# Patient Record
Sex: FEMALE | Race: WHITE | NOT HISPANIC OR LATINO | Employment: UNEMPLOYED | URBAN - METROPOLITAN AREA
[De-identification: names, ages, dates, MRNs, and addresses within clinical notes are randomized per-mention and may not be internally consistent; named-entity substitution may affect disease eponyms.]

---

## 2022-11-29 ENCOUNTER — OFFICE VISIT (OUTPATIENT)
Dept: FAMILY MEDICINE CLINIC | Facility: CLINIC | Age: 37
End: 2022-11-29

## 2022-11-29 VITALS
SYSTOLIC BLOOD PRESSURE: 140 MMHG | OXYGEN SATURATION: 98 % | WEIGHT: 129.8 LBS | DIASTOLIC BLOOD PRESSURE: 90 MMHG | HEIGHT: 66 IN | RESPIRATION RATE: 16 BRPM | BODY MASS INDEX: 20.86 KG/M2 | HEART RATE: 88 BPM | TEMPERATURE: 98.6 F

## 2022-11-29 DIAGNOSIS — J45.20 MILD INTERMITTENT ASTHMA WITHOUT COMPLICATION: Primary | ICD-10-CM

## 2022-11-29 DIAGNOSIS — J30.1 SEASONAL ALLERGIC RHINITIS DUE TO POLLEN: ICD-10-CM

## 2022-11-29 RX ORDER — ALBUTEROL SULFATE 90 UG/1
2 AEROSOL, METERED RESPIRATORY (INHALATION) EVERY 6 HOURS PRN
Qty: 6.7 G | Refills: 5 | Status: SHIPPED | OUTPATIENT
Start: 2022-11-29

## 2022-11-29 RX ORDER — MONTELUKAST SODIUM 10 MG/1
TABLET ORAL
COMMUNITY
Start: 2022-10-15

## 2022-11-29 RX ORDER — FLUTICASONE FUROATE AND VILANTEROL 100; 25 UG/1; UG/1
1 POWDER RESPIRATORY (INHALATION) DAILY
Qty: 60 BLISTER | Refills: 0 | Status: SHIPPED | OUTPATIENT
Start: 2022-11-29 | End: 2022-12-29

## 2022-11-29 NOTE — PROGRESS NOTES
Assessment/Plan:         Problem List Items Addressed This Visit        Respiratory    Mild intermittent asthma without complication - Primary     Under control  Continue current medication  We will re-evaluate at next office visit  Relevant Medications    montelukast (SINGULAIR) 10 mg tablet    Fluticasone Furoate-Vilanterol (Breo Ellipta) 100-25 mcg/actuation inhaler    albuterol (Proventil HFA) 90 mcg/act inhaler    Seasonal allergic rhinitis due to pollen     Under control  Continue current medication  We will re-evaluate at next office visit  Subjective:      Patient ID: Eleazar Munguia is a 42 y o  female  Patient here for review of chronic medical problems and review of the labs and imaging if it is applicable  Currently has no specific complaints other than mentioned in the review of systems  Denies chest pain,, abdominal pain, nausea, vomiting, fever, chills, lightheadedness, dizziness,headache, tingling or numbness  No bowel or bladder problem  Lately patient using rescue inhaler quite often because of allergies acting up and she does feel like chest tightness and wheezing she uses rescue inhaler at least once or twice a day with she never used to do that way        The following portions of the patient's history were reviewed and updated as appropriate:   Past Medical History:  She has a past medical history of Asthma ,  _______________________________________________________________________  Medical Problems:  does not have any pertinent problems on file ,  _______________________________________________________________________  Past Surgical History:   has no past surgical history on file ,  _______________________________________________________________________  Family History:  family history includes Heart attack in her father; Hypertension in her father ,  _______________________________________________________________________  Social History:   reports that she has never smoked  She has never used smokeless tobacco  She reports that she does not drink alcohol and does not use drugs  ,  _______________________________________________________________________  Allergies:  has No Known Allergies     _______________________________________________________________________  Current Outpatient Medications   Medication Sig Dispense Refill   • albuterol (Proventil HFA) 90 mcg/act inhaler Inhale 2 puffs every 6 (six) hours as needed for wheezing 6 7 g 5   • Fexofenadine HCl (ALLEGRA PO) Take by mouth in the morning     • Fluticasone Furoate-Vilanterol (Breo Ellipta) 100-25 mcg/actuation inhaler Inhale 1 puff daily Rinse mouth after use  60 blister 0   • montelukast (SINGULAIR) 10 mg tablet        No current facility-administered medications for this visit      _______________________________________________________________________  Review of Systems   Constitutional: Negative for chills, fatigue and fever  HENT: Negative for congestion, ear pain, rhinorrhea, sneezing and sore throat  Eyes: Negative for redness, itching and visual disturbance  Respiratory: Negative for cough, chest tightness and shortness of breath  Cardiovascular: Negative for chest pain, palpitations and leg swelling  Gastrointestinal: Negative for abdominal pain, blood in stool, diarrhea, nausea and vomiting  Endocrine: Negative for cold intolerance and heat intolerance  Genitourinary: Negative for dysuria, frequency and urgency  Musculoskeletal: Negative for arthralgias, back pain and myalgias  Skin: Negative for color change and rash  Neurological: Negative for dizziness, weakness, light-headedness, numbness and headaches  Hematological: Does not bruise/bleed easily  Psychiatric/Behavioral: Negative for agitation, behavioral problems and confusion           Objective:  Vitals:    11/29/22 1553   BP: 140/90   BP Location: Left arm   Patient Position: Sitting   Cuff Size: Standard Pulse: 88   Resp: 16   Temp: 98 6 °F (37 °C)   TempSrc: Temporal   SpO2: 98%   Weight: 58 9 kg (129 lb 12 8 oz)   Height: 5' 6" (1 676 m)     Body mass index is 20 95 kg/m²  Physical Exam  Vitals and nursing note reviewed  Constitutional:       General: She is not in acute distress  Appearance: Normal appearance  She is not ill-appearing, toxic-appearing or diaphoretic  HENT:      Head: Normocephalic and atraumatic  Right Ear: Tympanic membrane normal       Left Ear: Tympanic membrane normal       Nose: Nose normal  No congestion  Mouth/Throat:      Mouth: Mucous membranes are moist    Eyes:      General: No scleral icterus  Right eye: No discharge  Left eye: No discharge  Extraocular Movements: Extraocular movements intact  Conjunctiva/sclera: Conjunctivae normal       Pupils: Pupils are equal, round, and reactive to light  Cardiovascular:      Rate and Rhythm: Normal rate and regular rhythm  Pulses: Normal pulses  Heart sounds: Normal heart sounds  No murmur heard  No gallop  Pulmonary:      Effort: Pulmonary effort is normal  No respiratory distress  Breath sounds: Normal breath sounds  No wheezing, rhonchi or rales  Abdominal:      General: Abdomen is flat  Bowel sounds are normal  There is no distension  Palpations: Abdomen is soft  Tenderness: There is no abdominal tenderness  There is no guarding  Musculoskeletal:         General: No swelling or tenderness  Normal range of motion  Cervical back: Normal range of motion and neck supple  No rigidity  Lymphadenopathy:      Cervical: No cervical adenopathy  Skin:     General: Skin is warm  Capillary Refill: Capillary refill takes 2 to 3 seconds  Coloration: Skin is not jaundiced  Findings: No bruising or rash  Neurological:      General: No focal deficit present  Mental Status: She is alert and oriented to person, place, and time   Mental status is at baseline        Gait: Gait normal    Psychiatric:         Mood and Affect: Mood normal

## 2022-12-09 ENCOUNTER — TELEPHONE (OUTPATIENT)
Dept: FAMILY MEDICINE CLINIC | Facility: CLINIC | Age: 37
End: 2022-12-09

## 2022-12-09 NOTE — TELEPHONE ENCOUNTER
it is usually viral infection at this point just treat the symptoms with Mucinex DM and Tylenol and maybe Flonase if no better by Monday or Tuesday then we can do virtual visit

## 2022-12-09 NOTE — TELEPHONE ENCOUNTER
Hi, this is Arjacquelinee [place] calling on the Patient therapy office  I was actually just in, I believe it was last week  If you would please give me a call back [phone]  I was hoping to possibly be able to get into see Doctor Vero Nelson today  Pretty sure I'm starting with sinus infection  If you could please give me a call back, I'd appreciate it  Thank you      Please advise Pt is not signed up for my chart

## 2022-12-12 ENCOUNTER — TELEPHONE (OUTPATIENT)
Dept: FAMILY MEDICINE CLINIC | Facility: CLINIC | Age: 37
End: 2022-12-12

## 2022-12-12 ENCOUNTER — OFFICE VISIT (OUTPATIENT)
Dept: FAMILY MEDICINE CLINIC | Facility: CLINIC | Age: 37
End: 2022-12-12

## 2022-12-12 VITALS
TEMPERATURE: 98.3 F | BODY MASS INDEX: 20.76 KG/M2 | OXYGEN SATURATION: 99 % | DIASTOLIC BLOOD PRESSURE: 66 MMHG | WEIGHT: 129.2 LBS | SYSTOLIC BLOOD PRESSURE: 110 MMHG | HEIGHT: 66 IN | RESPIRATION RATE: 18 BRPM | HEART RATE: 94 BPM

## 2022-12-12 DIAGNOSIS — J01.00 ACUTE NON-RECURRENT MAXILLARY SINUSITIS: Primary | ICD-10-CM

## 2022-12-12 RX ORDER — AMOXICILLIN AND CLAVULANATE POTASSIUM 875; 125 MG/1; MG/1
1 TABLET, FILM COATED ORAL EVERY 12 HOURS SCHEDULED
Qty: 20 TABLET | Refills: 0 | Status: SHIPPED | OUTPATIENT
Start: 2022-12-12 | End: 2022-12-22

## 2022-12-12 NOTE — PROGRESS NOTES
Assessment/Plan:         Problem List Items Addressed This Visit    None  Visit Diagnoses     Acute non-recurrent maxillary sinusitis    -  Primary    Relevant Medications    amoxicillin-clavulanate (Augmentin) 875-125 mg per tablet            Subjective:      Patient ID: Kenan Ray is a 42 y o  female  HPI    The following portions of the patient's history were reviewed and updated as appropriate:   Past Medical History:  She has a past medical history of Asthma ,  _______________________________________________________________________  Medical Problems:  does not have any pertinent problems on file ,  _______________________________________________________________________  Past Surgical History:   has no past surgical history on file ,  _______________________________________________________________________  Family History:  family history includes Heart attack in her father; Hypertension in her father ,  _______________________________________________________________________  Social History:   reports that she has never smoked  She has been exposed to tobacco smoke  She has never used smokeless tobacco  She reports that she does not drink alcohol and does not use drugs  ,  _______________________________________________________________________  Allergies:  has No Known Allergies     _______________________________________________________________________  Current Outpatient Medications   Medication Sig Dispense Refill   • albuterol (Proventil HFA) 90 mcg/act inhaler Inhale 2 puffs every 6 (six) hours as needed for wheezing 6 7 g 5   • amoxicillin-clavulanate (Augmentin) 875-125 mg per tablet Take 1 tablet by mouth every 12 (twelve) hours for 10 days 20 tablet 0   • Fexofenadine HCl (ALLEGRA PO) Take by mouth in the morning     • Fluticasone Furoate-Vilanterol (Breo Ellipta) 100-25 mcg/actuation inhaler Inhale 1 puff daily Rinse mouth after use   60 blister 0   • montelukast (SINGULAIR) 10 mg tablet        No current facility-administered medications for this visit      _______________________________________________________________________  Review of Systems   Constitutional: Negative for chills, fatigue and fever  HENT: Positive for congestion, hearing loss (reduced), postnasal drip, rhinorrhea, sinus pressure, sinus pain, sore throat and voice change  Negative for ear pain and sneezing  Eyes: Negative for redness, itching and visual disturbance  Respiratory: Positive for cough  Negative for chest tightness and shortness of breath  Cardiovascular: Negative for chest pain, palpitations and leg swelling  Gastrointestinal: Negative for abdominal pain, blood in stool, diarrhea, nausea and vomiting  Endocrine: Negative for cold intolerance and heat intolerance  Genitourinary: Negative for dysuria, frequency and urgency  Musculoskeletal: Negative for arthralgias, back pain and myalgias  Skin: Negative for color change and rash  Neurological: Positive for headaches  Negative for dizziness, weakness, light-headedness and numbness  Hematological: Does not bruise/bleed easily  Psychiatric/Behavioral: Negative for agitation, behavioral problems and confusion  Objective:  Vitals:    12/12/22 1613   BP: 110/66   BP Location: Left arm   Patient Position: Sitting   Cuff Size: Standard   Pulse: 94   Resp: 18   Temp: 98 3 °F (36 8 °C)   TempSrc: Tympanic   SpO2: 99%   Weight: 58 6 kg (129 lb 3 2 oz)   Height: 5' 6" (1 676 m)     Body mass index is 20 85 kg/m²  Physical Exam  Vitals and nursing note reviewed  Constitutional:       General: She is not in acute distress  Appearance: Normal appearance  She is not ill-appearing, toxic-appearing or diaphoretic  HENT:      Head: Normocephalic and atraumatic  Right Ear: Tympanic membrane normal       Left Ear: Tympanic membrane normal       Nose: Congestion and rhinorrhea present        Mouth/Throat:      Mouth: Mucous membranes are moist  Pharynx: Posterior oropharyngeal erythema present  Eyes:      General: No scleral icterus  Right eye: No discharge  Left eye: No discharge  Extraocular Movements: Extraocular movements intact  Conjunctiva/sclera: Conjunctivae normal       Pupils: Pupils are equal, round, and reactive to light  Cardiovascular:      Rate and Rhythm: Normal rate and regular rhythm  Pulses: Normal pulses  Heart sounds: Normal heart sounds  No murmur heard  No gallop  Pulmonary:      Effort: Pulmonary effort is normal  No respiratory distress  Breath sounds: Wheezing present  No rhonchi or rales  Abdominal:      General: Abdomen is flat  Bowel sounds are normal  There is no distension  Palpations: Abdomen is soft  Tenderness: There is no abdominal tenderness  There is no guarding  Musculoskeletal:         General: No swelling or tenderness  Normal range of motion  Cervical back: Normal range of motion and neck supple  No rigidity  Lymphadenopathy:      Cervical: No cervical adenopathy  Skin:     General: Skin is warm  Capillary Refill: Capillary refill takes 2 to 3 seconds  Coloration: Skin is not jaundiced  Findings: No bruising or rash  Neurological:      General: No focal deficit present  Mental Status: She is alert and oriented to person, place, and time  Mental status is at baseline        Gait: Gait normal    Psychiatric:         Mood and Affect: Mood normal

## 2022-12-12 NOTE — TELEPHONE ENCOUNTER
Hi, this is Sukhjinder Hum calling  My phone number is [phone]  I was calling because I'm still not feeling much better with what I've been doing here  I still have a ton of sinus pressure  My ears are bothering me, my jaw hurts  So I would like to be able to get in and see Doctor Iram Paulino at some point today  So if you could please give me a call back, I'd appreciate it  Again, my phone number is [phone]  Thank you      Pt is not signed up for my chart    Please advise in office or virtual

## 2023-01-13 DIAGNOSIS — J45.20 MILD INTERMITTENT ASTHMA WITHOUT COMPLICATION: Primary | ICD-10-CM

## 2023-01-13 RX ORDER — MONTELUKAST SODIUM 10 MG/1
TABLET ORAL
Qty: 90 TABLET | Refills: 3 | Status: SHIPPED | OUTPATIENT
Start: 2023-01-13

## 2023-04-05 ENCOUNTER — OFFICE VISIT (OUTPATIENT)
Dept: FAMILY MEDICINE CLINIC | Facility: CLINIC | Age: 38
End: 2023-04-05

## 2023-04-05 VITALS
HEART RATE: 78 BPM | SYSTOLIC BLOOD PRESSURE: 120 MMHG | DIASTOLIC BLOOD PRESSURE: 76 MMHG | RESPIRATION RATE: 18 BRPM | HEIGHT: 66 IN | WEIGHT: 125 LBS | BODY MASS INDEX: 20.09 KG/M2 | OXYGEN SATURATION: 99 % | TEMPERATURE: 97.8 F

## 2023-04-05 DIAGNOSIS — J45.20 MILD INTERMITTENT ASTHMA WITHOUT COMPLICATION: Primary | ICD-10-CM

## 2023-04-05 DIAGNOSIS — J30.1 SEASONAL ALLERGIC RHINITIS DUE TO POLLEN: ICD-10-CM

## 2023-04-05 DIAGNOSIS — E55.9 VITAMIN D DEFICIENCY: ICD-10-CM

## 2023-04-05 DIAGNOSIS — E78.2 MIXED HYPERLIPIDEMIA: ICD-10-CM

## 2023-04-05 DIAGNOSIS — Z00.00 HEALTHCARE MAINTENANCE: ICD-10-CM

## 2023-04-05 NOTE — PROGRESS NOTES
Assessment/Plan:         Problem List Items Addressed This Visit        Respiratory    Mild intermittent asthma without complication - Primary     Under control  Continue current medication  We will re-evaluate at next office visit  Seasonal allergic rhinitis due to pollen     Under control  Continue current medication  We will re-evaluate at next office visit  Other Visit Diagnoses     Vitamin D deficiency        Relevant Orders    Vitamin D 25 hydroxy    Healthcare maintenance        Relevant Orders    CBC and differential    Comprehensive metabolic panel    Mixed hyperlipidemia        Relevant Orders    Lipid Panel with Direct LDL reflex            Subjective:      Patient ID: Margaret Cortez is a 42 y o  female  Patient here for review of chronic medical problems and review of the labs and imaging if it is applicable  Currently has no specific complaints other than mentioned in the review of systems  Denies chest pain, SOB, cough, abdominal pain, nausea, vomiting, fever, chills, lightheadedness, dizziness,headache, tingling or numbness  No bowel or bladder problem  The following portions of the patient's history were reviewed and updated as appropriate:   Past Medical History:  She has a past medical history of Allergic rhinitis and Asthma ,  _______________________________________________________________________  Medical Problems:  does not have any pertinent problems on file ,  _______________________________________________________________________  Past Surgical History:   has a past surgical history that includes Breast surgery;  section; Leg Surgery; and Nasal septum surgery  ,  _______________________________________________________________________  Family History:  family history includes Heart attack in her father; Hyperlipidemia in her mother; Hypertension in her father and mother ,  _______________________________________________________________________  Social History:   reports that she has never smoked  She has been exposed to tobacco smoke  She has never used smokeless tobacco  She reports that she does not drink alcohol and does not use drugs  ,  _______________________________________________________________________  Allergies:  is allergic to latex     _______________________________________________________________________  Current Outpatient Medications   Medication Sig Dispense Refill   • albuterol (Proventil HFA) 90 mcg/act inhaler Inhale 2 puffs every 6 (six) hours as needed for wheezing 6 7 g 5   • Fexofenadine HCl (ALLEGRA PO) Take by mouth in the morning     • montelukast (SINGULAIR) 10 mg tablet TAKE 1 TABLET DAILY 90 tablet 3     No current facility-administered medications for this visit      _______________________________________________________________________  Review of Systems   Constitutional: Negative for chills, fatigue and fever  HENT: Negative for congestion, ear pain, rhinorrhea, sneezing and sore throat  Eyes: Negative for redness, itching and visual disturbance  Respiratory: Negative for cough, chest tightness and shortness of breath  Cardiovascular: Negative for chest pain, palpitations and leg swelling  Gastrointestinal: Negative for abdominal pain, blood in stool, diarrhea, nausea and vomiting  Endocrine: Negative for cold intolerance and heat intolerance  Genitourinary: Negative for dysuria, frequency and urgency  Musculoskeletal: Negative for arthralgias, back pain and myalgias  Skin: Negative for color change and rash  Neurological: Negative for dizziness, weakness, light-headedness, numbness and headaches  Hematological: Does not bruise/bleed easily  Psychiatric/Behavioral: Negative for agitation, behavioral problems and confusion           Objective:  Vitals:    04/05/23 1202   BP: 120/76   BP Location: Left arm   Patient "Position: Sitting   Cuff Size: Standard   Pulse: 78   Resp: 18   Temp: 97 8 °F (36 6 °C)   TempSrc: Tympanic   SpO2: 99%   Weight: 56 7 kg (125 lb)   Height: 5' 6\" (1 676 m)     Body mass index is 20 18 kg/m²  Physical Exam  Vitals and nursing note reviewed  Constitutional:       General: She is not in acute distress  Appearance: Normal appearance  She is not ill-appearing, toxic-appearing or diaphoretic  HENT:      Head: Normocephalic and atraumatic  Right Ear: Tympanic membrane and ear canal normal       Left Ear: Tympanic membrane and ear canal normal       Nose: Nose normal  No congestion  Mouth/Throat:      Mouth: Mucous membranes are moist    Eyes:      General: No scleral icterus  Right eye: No discharge  Left eye: No discharge  Extraocular Movements: Extraocular movements intact  Conjunctiva/sclera: Conjunctivae normal       Pupils: Pupils are equal, round, and reactive to light  Cardiovascular:      Rate and Rhythm: Normal rate and regular rhythm  Pulses: Normal pulses  Heart sounds: Normal heart sounds  No murmur heard  No gallop  Pulmonary:      Effort: Pulmonary effort is normal  No respiratory distress  Breath sounds: Normal breath sounds  No wheezing, rhonchi or rales  Abdominal:      General: Abdomen is flat  Bowel sounds are normal  There is no distension  Palpations: Abdomen is soft  Tenderness: There is no abdominal tenderness  There is no right CVA tenderness, left CVA tenderness or guarding  Musculoskeletal:         General: No swelling or tenderness  Normal range of motion  Cervical back: Normal range of motion and neck supple  No rigidity  Right lower leg: No edema  Left lower leg: No edema  Lymphadenopathy:      Cervical: No cervical adenopathy  Skin:     General: Skin is warm  Capillary Refill: Capillary refill takes 2 to 3 seconds  Coloration: Skin is not jaundiced        Findings: " No bruising or rash  Neurological:      General: No focal deficit present  Mental Status: She is alert and oriented to person, place, and time  Mental status is at baseline  Motor: No weakness        Gait: Gait normal    Psychiatric:         Mood and Affect: Mood normal          Behavior: Behavior normal

## 2023-04-14 ENCOUNTER — TELEPHONE (OUTPATIENT)
Dept: FAMILY MEDICINE CLINIC | Facility: CLINIC | Age: 38
End: 2023-04-14

## 2023-04-14 NOTE — TELEPHONE ENCOUNTER
Admitted to [facility] on 9/15/21.       Pt called requesting an inhaler as her allergies are bad and she feels a heaviness in her chest  Does not want Breo

## 2023-07-21 ENCOUNTER — APPOINTMENT (OUTPATIENT)
Dept: LAB | Facility: MEDICAL CENTER | Age: 38
End: 2023-07-21
Payer: COMMERCIAL

## 2023-07-21 DIAGNOSIS — Z00.00 HEALTHCARE MAINTENANCE: ICD-10-CM

## 2023-07-21 DIAGNOSIS — E55.9 VITAMIN D DEFICIENCY: ICD-10-CM

## 2023-07-21 DIAGNOSIS — E78.2 MIXED HYPERLIPIDEMIA: ICD-10-CM

## 2023-07-21 LAB
25(OH)D3 SERPL-MCNC: 67.8 NG/ML (ref 30–100)
ALBUMIN SERPL BCP-MCNC: 4 G/DL (ref 3.5–5)
ALP SERPL-CCNC: 52 U/L (ref 46–116)
ALT SERPL W P-5'-P-CCNC: 26 U/L (ref 12–78)
ANION GAP SERPL CALCULATED.3IONS-SCNC: 4 MMOL/L
AST SERPL W P-5'-P-CCNC: 17 U/L (ref 5–45)
BASOPHILS # BLD AUTO: 0.03 THOUSANDS/ÂΜL (ref 0–0.1)
BASOPHILS NFR BLD AUTO: 1 % (ref 0–1)
BILIRUB SERPL-MCNC: 0.65 MG/DL (ref 0.2–1)
BUN SERPL-MCNC: 10 MG/DL (ref 5–25)
CALCIUM SERPL-MCNC: 9.2 MG/DL (ref 8.3–10.1)
CHLORIDE SERPL-SCNC: 108 MMOL/L (ref 96–108)
CHOLEST SERPL-MCNC: 210 MG/DL
CO2 SERPL-SCNC: 27 MMOL/L (ref 21–32)
CREAT SERPL-MCNC: 0.86 MG/DL (ref 0.6–1.3)
EOSINOPHIL # BLD AUTO: 0.04 THOUSAND/ÂΜL (ref 0–0.61)
EOSINOPHIL NFR BLD AUTO: 1 % (ref 0–6)
ERYTHROCYTE [DISTWIDTH] IN BLOOD BY AUTOMATED COUNT: 12 % (ref 11.6–15.1)
GFR SERPL CREATININE-BSD FRML MDRD: 84 ML/MIN/1.73SQ M
GLUCOSE P FAST SERPL-MCNC: 89 MG/DL (ref 65–99)
HCT VFR BLD AUTO: 42.6 % (ref 34.8–46.1)
HDLC SERPL-MCNC: 71 MG/DL
HGB BLD-MCNC: 14.5 G/DL (ref 11.5–15.4)
IMM GRANULOCYTES # BLD AUTO: 0.01 THOUSAND/UL (ref 0–0.2)
IMM GRANULOCYTES NFR BLD AUTO: 0 % (ref 0–2)
LDLC SERPL CALC-MCNC: 128 MG/DL (ref 0–100)
LYMPHOCYTES # BLD AUTO: 1.44 THOUSANDS/ÂΜL (ref 0.6–4.47)
LYMPHOCYTES NFR BLD AUTO: 31 % (ref 14–44)
MCH RBC QN AUTO: 30.1 PG (ref 26.8–34.3)
MCHC RBC AUTO-ENTMCNC: 34 G/DL (ref 31.4–37.4)
MCV RBC AUTO: 88 FL (ref 82–98)
MONOCYTES # BLD AUTO: 0.34 THOUSAND/ÂΜL (ref 0.17–1.22)
MONOCYTES NFR BLD AUTO: 7 % (ref 4–12)
NEUTROPHILS # BLD AUTO: 2.81 THOUSANDS/ÂΜL (ref 1.85–7.62)
NEUTS SEG NFR BLD AUTO: 60 % (ref 43–75)
NRBC BLD AUTO-RTO: 0 /100 WBCS
PLATELET # BLD AUTO: 230 THOUSANDS/UL (ref 149–390)
PMV BLD AUTO: 10 FL (ref 8.9–12.7)
POTASSIUM SERPL-SCNC: 4.1 MMOL/L (ref 3.5–5.3)
PROT SERPL-MCNC: 7 G/DL (ref 6.4–8.4)
RBC # BLD AUTO: 4.82 MILLION/UL (ref 3.81–5.12)
SODIUM SERPL-SCNC: 139 MMOL/L (ref 135–147)
TRIGL SERPL-MCNC: 57 MG/DL
WBC # BLD AUTO: 4.67 THOUSAND/UL (ref 4.31–10.16)

## 2023-07-21 PROCEDURE — 80053 COMPREHEN METABOLIC PANEL: CPT

## 2023-07-21 PROCEDURE — 36415 COLL VENOUS BLD VENIPUNCTURE: CPT

## 2023-07-21 PROCEDURE — 82306 VITAMIN D 25 HYDROXY: CPT

## 2023-07-21 PROCEDURE — 85025 COMPLETE CBC W/AUTO DIFF WBC: CPT

## 2023-07-21 PROCEDURE — 80061 LIPID PANEL: CPT

## 2023-07-27 ENCOUNTER — OFFICE VISIT (OUTPATIENT)
Dept: FAMILY MEDICINE CLINIC | Facility: CLINIC | Age: 38
End: 2023-07-27
Payer: COMMERCIAL

## 2023-07-27 VITALS
OXYGEN SATURATION: 100 % | TEMPERATURE: 97.7 F | RESPIRATION RATE: 18 BRPM | HEIGHT: 66 IN | HEART RATE: 75 BPM | WEIGHT: 125.4 LBS | BODY MASS INDEX: 20.15 KG/M2 | DIASTOLIC BLOOD PRESSURE: 80 MMHG | SYSTOLIC BLOOD PRESSURE: 122 MMHG

## 2023-07-27 DIAGNOSIS — J45.20 MILD INTERMITTENT ASTHMA WITHOUT COMPLICATION: Primary | ICD-10-CM

## 2023-07-27 DIAGNOSIS — J30.1 SEASONAL ALLERGIC RHINITIS DUE TO POLLEN: ICD-10-CM

## 2023-07-27 DIAGNOSIS — E78.2 MIXED HYPERLIPIDEMIA: ICD-10-CM

## 2023-07-27 PROCEDURE — 99213 OFFICE O/P EST LOW 20 MIN: CPT

## 2023-07-27 NOTE — PROGRESS NOTES
Assessment/Plan:         Problem List Items Addressed This Visit        Respiratory    Mild intermittent asthma without complication - Primary     Under control. Continue current medication. We will re-evaluate at next office visit. Seasonal allergic rhinitis due to pollen     Under control. Continue current medication. We will re-evaluate at next office visit. Other    Mixed hyperlipidemia     Under reasonable control with diet and exercise we will continue to monitor              Subjective:      Patient ID: Jefferson Costa is a 34 y.o. female. Patient here for review of chronic medical problems and  the labs and imaging if it is applicable. Currently has no specific complaints other than mentioned in the review of systems  Denies chest pain, SOB, cough, abdominal pain, nausea, vomiting, fever, chills, lightheadedness, dizziness,headache, tingling or numbness. No bowel or bladder problem. The following portions of the patient's history were reviewed and updated as appropriate:   Past Medical History:  She has a past medical history of Allergic rhinitis and Asthma.,  _______________________________________________________________________  Medical Problems:  does not have any pertinent problems on file.,  _______________________________________________________________________  Past Surgical History:   has a past surgical history that includes Breast surgery;  section; Leg Surgery; and Nasal septum surgery. ,  _______________________________________________________________________  Family History:  family history includes Heart attack in her father; Hyperlipidemia in her mother; Hypertension in her father and mother.,  _______________________________________________________________________  Social History:   reports that she has never smoked. She has been exposed to tobacco smoke.  She has never used smokeless tobacco. She reports that she does not drink alcohol and does not use drugs. ,  _______________________________________________________________________  Allergies:  is allergic to latex. .  _______________________________________________________________________  Current Outpatient Medications   Medication Sig Dispense Refill   • albuterol (Proventil HFA) 90 mcg/act inhaler Inhale 2 puffs every 6 (six) hours as needed for wheezing 6.7 g 5   • Fexofenadine HCl (ALLEGRA PO) Take by mouth in the morning     • montelukast (SINGULAIR) 10 mg tablet TAKE 1 TABLET DAILY 90 tablet 3     No current facility-administered medications for this visit.     _______________________________________________________________________  Review of Systems   Constitutional: Negative for chills, fatigue and fever. HENT: Negative for congestion, ear pain, rhinorrhea, sneezing and sore throat. Eyes: Negative for redness, itching and visual disturbance. Respiratory: Negative for cough, chest tightness and shortness of breath. Cardiovascular: Negative for chest pain, palpitations and leg swelling. Gastrointestinal: Negative for abdominal pain, blood in stool, diarrhea, nausea and vomiting. Endocrine: Negative for cold intolerance and heat intolerance. Genitourinary: Negative for dysuria, frequency and urgency. Musculoskeletal: Negative for arthralgias, back pain and myalgias. Skin: Negative for color change and rash. Neurological: Negative for dizziness, weakness, light-headedness, numbness and headaches. Hematological: Does not bruise/bleed easily. Psychiatric/Behavioral: Negative for agitation, behavioral problems and confusion. Objective:  Vitals:    07/27/23 1054   BP: 122/80   BP Location: Left arm   Patient Position: Sitting   Cuff Size: Adult   Pulse: 75   Resp: 18   Temp: 97.7 °F (36.5 °C)   TempSrc: Tympanic   SpO2: 100%   Weight: 56.9 kg (125 lb 6.4 oz)   Height: 5' 6" (1.676 m)     Body mass index is 20.24 kg/m².      Physical Exam  Vitals and nursing note reviewed. Constitutional:       General: She is not in acute distress. Appearance: Normal appearance. She is not ill-appearing, toxic-appearing or diaphoretic. HENT:      Head: Normocephalic and atraumatic. Right Ear: Tympanic membrane and ear canal normal.      Left Ear: Tympanic membrane and ear canal normal.      Nose: Nose normal. No congestion. Mouth/Throat:      Mouth: Mucous membranes are moist.   Eyes:      General: No scleral icterus. Right eye: No discharge. Left eye: No discharge. Extraocular Movements: Extraocular movements intact. Conjunctiva/sclera: Conjunctivae normal.      Pupils: Pupils are equal, round, and reactive to light. Cardiovascular:      Rate and Rhythm: Normal rate and regular rhythm. Pulses: Normal pulses. Heart sounds: Normal heart sounds. No murmur heard. No gallop. Pulmonary:      Effort: Pulmonary effort is normal. No respiratory distress. Breath sounds: Normal breath sounds. No wheezing, rhonchi or rales. Abdominal:      General: Abdomen is flat. Bowel sounds are normal. There is no distension. Palpations: Abdomen is soft. Tenderness: There is no abdominal tenderness. There is no right CVA tenderness, left CVA tenderness or guarding. Musculoskeletal:         General: No swelling or tenderness. Normal range of motion. Cervical back: Normal range of motion and neck supple. No rigidity. Right lower leg: No edema. Left lower leg: No edema. Lymphadenopathy:      Cervical: No cervical adenopathy. Skin:     General: Skin is warm. Capillary Refill: Capillary refill takes 2 to 3 seconds. Coloration: Skin is not jaundiced. Findings: No bruising or rash. Neurological:      General: No focal deficit present. Mental Status: She is alert and oriented to person, place, and time. Mental status is at baseline. Motor: No weakness.       Gait: Gait normal.   Psychiatric: Mood and Affect: Mood normal.         Behavior: Behavior normal.

## 2023-11-15 ENCOUNTER — TELEPHONE (OUTPATIENT)
Dept: FAMILY MEDICINE CLINIC | Facility: CLINIC | Age: 38
End: 2023-11-15

## 2023-11-15 NOTE — TELEPHONE ENCOUNTER
Called patient to reschedule cancelled Nov appointment for a physical. Asked her to call [phone] option 1 for an annual physical before the end of the year

## 2023-12-23 ENCOUNTER — OFFICE VISIT (OUTPATIENT)
Dept: URGENT CARE | Facility: CLINIC | Age: 38
End: 2023-12-23
Payer: COMMERCIAL

## 2023-12-23 VITALS
DIASTOLIC BLOOD PRESSURE: 78 MMHG | OXYGEN SATURATION: 99 % | HEART RATE: 98 BPM | WEIGHT: 125 LBS | RESPIRATION RATE: 18 BRPM | BODY MASS INDEX: 20.18 KG/M2 | SYSTOLIC BLOOD PRESSURE: 128 MMHG | TEMPERATURE: 97.8 F

## 2023-12-23 DIAGNOSIS — J01.00 ACUTE NON-RECURRENT MAXILLARY SINUSITIS: Primary | ICD-10-CM

## 2023-12-23 PROBLEM — D68.51 HETEROZYGOUS FACTOR V LEIDEN MUTATION (HCC): Status: ACTIVE | Noted: 2020-11-20

## 2023-12-23 PROBLEM — L70.9 ACNE: Status: ACTIVE | Noted: 2020-11-20

## 2023-12-23 PROBLEM — J45.909 ASTHMA: Status: ACTIVE | Noted: 2017-12-21

## 2023-12-23 PROBLEM — N92.0 MENORRHAGIA: Status: ACTIVE | Noted: 2018-01-16

## 2023-12-23 PROCEDURE — G0382 LEV 3 HOSP TYPE B ED VISIT: HCPCS

## 2023-12-23 RX ORDER — AZITHROMYCIN 250 MG/1
TABLET, FILM COATED ORAL
Qty: 6 TABLET | Refills: 0 | Status: SHIPPED | OUTPATIENT
Start: 2023-12-23 | End: 2023-12-27

## 2023-12-23 NOTE — PATIENT INSTRUCTIONS
Take antibiotic as directed for next 5 days. Complete entire course of therapy even if symptoms improve and take medication with food to avoid upset stomach. Use flonase with nasal saline for congestion, mucinex for cough, and continue inhalers/nebulizers as previously prescribed. Follow-up with PCP in 3-5 days if no improvement of symptoms. Report to the ER if symptoms worsen.

## 2023-12-23 NOTE — PROGRESS NOTES
[facility]        NAME: Sera Mendosa is a 38 y.o. female  : 1985    MRN: [id number]  DATE: 2023  TIME: 1:27 PM    Assessment and Plan   Acute non-recurrent maxillary sinusitis [J01.00]  1. Acute non-recurrent maxillary sinusitis  azithromycin (ZITHROMAX) 250 mg tablet        COVID/flu testing deferred as symptoms have been ongoing for one week. Antibiotics as directed for presumed bacterial sinus infection. VSS in clinic, appears in no acute distress. Educated on use of OTC products for symptoms. Advised close follow-up with PCP or to report to the ER if symptoms worsen. Patient verbalizes understanding and agreeable to plan.     Patient Instructions     Take antibiotic as directed for next 5 days. Complete entire course of therapy even if symptoms improve and take medication with food to avoid upset stomach. Use flonase with nasal saline for congestion, mucinex for cough, and continue inhalers/nebulizers as previously prescribed. Follow-up with PCP in 3-5 days if no improvement of symptoms. Report to the ER if symptoms worsen.      Chief Complaint     Chief Complaint   Patient presents with    Cough     Pt is here for cough, sinus pressure, chest congestion, nasal congestion since  morning. States that she does have asthma and had to use her inhaler.          History of Present Illness       38 year old female presents with her daughter for evaluation of cough and congestion for the past 6 days. Her daughter at bedside has similar symptoms that started prior to hers and she does have a history of asthma. She is not UTD on COVID or flu vaccines for this season. She did not take any home COVID tests. She reports occasionally productive sputum and a sore throat in the morning that seems to resolve throughout the day but denies associated fevers or shortness of breath. She relates the cough is worse at night when lying down. She has been taking mucinex and using her inhalers as  prescribed with minimal improvement of symptoms.    Cough  This is a new problem. The current episode started in the past 7 days. The problem has been unchanged. The problem occurs every few minutes. The cough is Productive of sputum. Associated symptoms include headaches, nasal congestion, postnasal drip, rhinorrhea and a sore throat. Pertinent negatives include no chest pain, chills, ear congestion, ear pain, fever, heartburn, hemoptysis, myalgias, rash, shortness of breath, sweats, weight loss or wheezing. The symptoms are aggravated by lying down. She has tried OTC cough suppressant and a beta-agonist inhaler for the symptoms. The treatment provided mild relief. Her past medical history is significant for asthma. There is no history of environmental allergies.       Review of Systems   Review of Systems   Constitutional:  Negative for activity change, appetite change, chills, fatigue, fever and weight loss.   HENT:  Positive for congestion, postnasal drip, rhinorrhea, sinus pressure, sinus pain and sore throat. Negative for ear pain, sneezing and trouble swallowing.    Respiratory:  Positive for cough. Negative for hemoptysis, shortness of breath and wheezing.    Cardiovascular:  Negative for chest pain.   Gastrointestinal:  Negative for abdominal pain, constipation, diarrhea, heartburn, nausea and vomiting.   Musculoskeletal:  Negative for arthralgias, back pain, myalgias and neck pain.   Skin:  Negative for color change, pallor and rash.   Allergic/Immunologic: Negative for environmental allergies and food allergies.   Neurological:  Positive for headaches. Negative for dizziness and light-headedness.         Current Medications       Current Outpatient Medications:     azithromycin (ZITHROMAX) 250 mg tablet, Take 2 tablets today then 1 tablet daily x 4 days, Disp: 6 tablet, Rfl: 0    albuterol (Proventil HFA) 90 mcg/act inhaler, Inhale 2 puffs every 6 (six) hours as needed for wheezing, Disp: 6.7 g, Rfl: 5     Fexofenadine HCl (ALLEGRA PO), Take by mouth in the morning, Disp: , Rfl:     montelukast (SINGULAIR) 10 mg tablet, TAKE 1 TABLET DAILY, Disp: 90 tablet, Rfl: 3    Current Allergies     Allergies as of 2023 - Reviewed 2023   Allergen Reaction Noted    Latex Rash 2023            The following portions of the patient's history were reviewed and updated as appropriate: allergies, current medications, past family history, past medical history, past social history, past surgical history and problem list.     Past Medical History:   Diagnosis Date    Allergic rhinitis     Asthma        Past Surgical History:   Procedure Laterality Date    BREAST SURGERY       SECTION      LEG SURGERY      NASAL SEPTUM SURGERY         Family History   Problem Relation Age of Onset    Hypertension Mother     Hyperlipidemia Mother     Hypertension Father     Heart attack Father          Medications have been verified.        Objective   /78   Pulse 98   Temp 97.8 °F (36.6 °C)   Resp 18   Wt 56.7 kg (125 lb)   SpO2 99%   BMI 20.18 kg/m²        Physical Exam     Physical Exam  Vitals and nursing note reviewed.   Constitutional:       General: She is awake.      Appearance: Normal appearance. She is well-developed and normal weight.   HENT:      Head: Normocephalic and atraumatic.      Right Ear: Hearing, ear canal and external ear normal. A middle ear effusion is present.      Left Ear: Hearing, ear canal and external ear normal. A middle ear effusion is present.      Nose: Congestion and rhinorrhea present.      Mouth/Throat:      Mouth: Mucous membranes are moist.      Pharynx: Posterior oropharyngeal erythema present. No oropharyngeal exudate.   Eyes:      Conjunctiva/sclera: Conjunctivae normal.   Cardiovascular:      Rate and Rhythm: Normal rate and regular rhythm.      Pulses: Normal pulses.      Heart sounds: Normal heart sounds.   Pulmonary:      Effort: Pulmonary effort is normal.      Breath  sounds: Normal breath sounds.   Musculoskeletal:      Cervical back: Normal range of motion and neck supple.   Skin:     General: Skin is warm and dry.   Neurological:      General: No focal deficit present.      Mental Status: She is alert and oriented to person, place, and time.   Psychiatric:         Mood and Affect: Mood normal.         Behavior: Behavior normal. Behavior is cooperative.         Thought Content: Thought content normal.         Judgment: Judgment normal.

## 2024-01-08 DIAGNOSIS — J45.20 MILD INTERMITTENT ASTHMA WITHOUT COMPLICATION: ICD-10-CM

## 2024-01-08 RX ORDER — MONTELUKAST SODIUM 10 MG/1
TABLET ORAL
Qty: 90 TABLET | Refills: 1 | Status: SHIPPED | OUTPATIENT
Start: 2024-01-08

## 2024-07-08 DIAGNOSIS — J45.20 MILD INTERMITTENT ASTHMA WITHOUT COMPLICATION: ICD-10-CM

## 2024-07-08 RX ORDER — MONTELUKAST SODIUM 10 MG/1
TABLET ORAL
Qty: 90 TABLET | Refills: 3 | Status: SHIPPED | OUTPATIENT
Start: 2024-07-08

## 2025-02-11 ENCOUNTER — OFFICE VISIT (OUTPATIENT)
Age: 40
End: 2025-02-11

## 2025-02-11 VITALS
RESPIRATION RATE: 18 BRPM | SYSTOLIC BLOOD PRESSURE: 132 MMHG | DIASTOLIC BLOOD PRESSURE: 80 MMHG | TEMPERATURE: 98.5 F | HEART RATE: 101 BPM | BODY MASS INDEX: 21.82 KG/M2 | WEIGHT: 135.2 LBS | OXYGEN SATURATION: 100 %

## 2025-02-11 DIAGNOSIS — J45.20 MILD INTERMITTENT ASTHMATIC BRONCHITIS WITHOUT COMPLICATION: Primary | ICD-10-CM

## 2025-02-11 DIAGNOSIS — J30.1 SEASONAL ALLERGIC RHINITIS DUE TO POLLEN: ICD-10-CM

## 2025-02-11 PROCEDURE — 99213 OFFICE O/P EST LOW 20 MIN: CPT

## 2025-02-11 RX ORDER — ALBUTEROL SULFATE 90 UG/1
2 INHALANT RESPIRATORY (INHALATION) EVERY 6 HOURS PRN
Qty: 6.7 G | Refills: 5 | Status: SHIPPED | OUTPATIENT
Start: 2025-02-11

## 2025-02-11 RX ORDER — FLUTICASONE PROPIONATE 50 MCG
1 SPRAY, SUSPENSION (ML) NASAL DAILY
Qty: 16 G | Refills: 1 | Status: SHIPPED | OUTPATIENT
Start: 2025-02-11

## 2025-02-11 RX ORDER — PREDNISONE 10 MG/1
10 TABLET ORAL DAILY
Qty: 5 TABLET | Refills: 0 | Status: SHIPPED | OUTPATIENT
Start: 2025-02-11 | End: 2025-02-16

## 2025-02-11 NOTE — PROGRESS NOTES
Name: Sera Mendosa      : 1985      MRN: [id number]  Encounter Provider: Ashley Salinas MD  Encounter Date: 2025   Encounter department: [facility]  :  Assessment & Plan  Mild intermittent asthmatic bronchitis without complication  Treat with prednisone 10 mg for 5 days continue albuterol as needed.    Orders:  •  predniSONE 10 mg tablet; Take 1 tablet (10 mg total) by mouth daily for 5 days  •  albuterol (Proventil HFA) 90 mcg/act inhaler; Inhale 2 puffs every 6 (six) hours as needed for wheezing    Seasonal allergic rhinitis due to pollen  Restart Flonase  Orders:  •  fluticasone (FLONASE) 50 mcg/act nasal spray; 1 spray into each nostril daily           History of Present Illness   Patient here for sick visit.  Started getting deep cough after hiking.  And she was improving and then again because of the strong smell she got a coughing spell and still have dry cough no fever no chills no chest pain or shortness of breath does have some wheezing and feeling of not getting enough air.  No headache no lightheadedness or dizziness no other complaints.      Review of Systems   Constitutional:  Negative for chills, fatigue and fever.   HENT:  Negative for congestion, ear pain, rhinorrhea, sneezing and sore throat.    Eyes:  Negative for redness, itching and visual disturbance.   Respiratory:  Positive for cough. Negative for chest tightness and shortness of breath.    Cardiovascular:  Negative for chest pain, palpitations and leg swelling.   Gastrointestinal:  Negative for abdominal pain, blood in stool, diarrhea, nausea and vomiting.   Endocrine: Negative for cold intolerance and heat intolerance.   Genitourinary:  Negative for dysuria, frequency and urgency.   Musculoskeletal:  Negative for arthralgias, back pain and myalgias.   Skin:  Negative for color change and rash.   Neurological:  Negative for dizziness, weakness, light-headedness, numbness and headaches.    Hematological:  Does not bruise/bleed easily.   Psychiatric/Behavioral:  Negative for agitation, behavioral problems and confusion.        Objective   /80 (BP Location: Right arm, Patient Position: Sitting, Cuff Size: Standard)   Pulse 101   Temp 98.5 °F (36.9 °C) (Temporal)   Resp 18   Wt 61.3 kg (135 lb 3.2 oz)   SpO2 100%   BMI 21.82 kg/m²      Physical Exam  Vitals and nursing note reviewed.   Constitutional:       General: She is not in acute distress.     Appearance: Normal appearance. She is well-developed. She is not ill-appearing, toxic-appearing or diaphoretic.   HENT:      Head: Normocephalic and atraumatic.      Right Ear: External ear normal.      Left Ear: External ear normal.      Nose: Nose normal.      Mouth/Throat:      Mouth: Mucous membranes are moist.      Pharynx: Oropharynx is clear.   Eyes:      General: No scleral icterus.        Right eye: No discharge.         Left eye: No discharge.      Extraocular Movements: Extraocular movements intact.      Conjunctiva/sclera: Conjunctivae normal.      Pupils: Pupils are equal, round, and reactive to light.   Cardiovascular:      Rate and Rhythm: Normal rate and regular rhythm.      Pulses: Normal pulses.      Heart sounds: Normal heart sounds. No murmur heard.  Pulmonary:      Effort: Pulmonary effort is normal. No respiratory distress.      Breath sounds: Normal breath sounds. No wheezing, rhonchi or rales.   Abdominal:      General: Abdomen is flat. Bowel sounds are normal. There is no distension.      Palpations: Abdomen is soft.      Tenderness: There is no abdominal tenderness. There is no right CVA tenderness, left CVA tenderness or guarding.   Musculoskeletal:         General: No swelling or tenderness. Normal range of motion.      Cervical back: Normal range of motion and neck supple. No rigidity.      Right lower leg: No edema.      Left lower leg: No edema.   Lymphadenopathy:      Cervical: No cervical adenopathy.   Skin:      General: Skin is warm and dry.      Capillary Refill: Capillary refill takes 2 to 3 seconds.      Coloration: Skin is not jaundiced or pale.   Neurological:      General: No focal deficit present.      Mental Status: She is alert and oriented to person, place, and time. Mental status is at baseline.      Motor: No weakness.      Gait: Gait normal.   Psychiatric:         Mood and Affect: Mood normal.         Behavior: Behavior normal.

## 2025-02-11 NOTE — ASSESSMENT & PLAN NOTE
Restart Flonase  Orders:  •  fluticasone (FLONASE) 50 mcg/act nasal spray; 1 spray into each nostril daily

## 2025-02-11 NOTE — ASSESSMENT & PLAN NOTE
Treat with prednisone 10 mg for 5 days continue albuterol as needed.    Orders:  •  predniSONE 10 mg tablet; Take 1 tablet (10 mg total) by mouth daily for 5 days  •  albuterol (Proventil HFA) 90 mcg/act inhaler; Inhale 2 puffs every 6 (six) hours as needed for wheezing

## 2025-02-13 ENCOUNTER — TELEPHONE (OUTPATIENT)
Age: 40
End: 2025-02-13

## 2025-02-13 DIAGNOSIS — J45.20 MILD INTERMITTENT ASTHMATIC BRONCHITIS WITHOUT COMPLICATION: Primary | ICD-10-CM

## 2025-02-13 RX ORDER — AZITHROMYCIN 250 MG/1
TABLET, FILM COATED ORAL
Qty: 6 TABLET | Refills: 0 | Status: SHIPPED | OUTPATIENT
Start: 2025-02-13 | End: 2025-02-17

## 2025-02-13 NOTE — TELEPHONE ENCOUNTER
Patient was in on the 2/11 she stated you prescribed her nasal spray and anabiotic. She stated she used the nasal spray and got worse yesterday with very stuffy nose and congestion in her nasal. She also stated she is coughing more. Patient would like to know what she can do ? Or if you can give her something else ?

## 2025-03-05 DIAGNOSIS — J30.1 SEASONAL ALLERGIC RHINITIS DUE TO POLLEN: ICD-10-CM

## 2025-03-06 RX ORDER — FLUTICASONE PROPIONATE 50 MCG
SPRAY, SUSPENSION (ML) NASAL
Qty: 48 ML | Refills: 1 | Status: SHIPPED | OUTPATIENT
Start: 2025-03-06

## 2025-07-01 DIAGNOSIS — J45.20 MILD INTERMITTENT ASTHMA WITHOUT COMPLICATION: ICD-10-CM

## 2025-07-02 RX ORDER — MONTELUKAST SODIUM 10 MG/1
10 TABLET ORAL DAILY
Qty: 90 TABLET | Refills: 1 | Status: SHIPPED | OUTPATIENT
Start: 2025-07-02